# Patient Record
Sex: MALE | Race: WHITE | Employment: FULL TIME | URBAN - NONMETROPOLITAN AREA
[De-identification: names, ages, dates, MRNs, and addresses within clinical notes are randomized per-mention and may not be internally consistent; named-entity substitution may affect disease eponyms.]

---

## 2021-08-05 ENCOUNTER — HOSPITAL ENCOUNTER (OUTPATIENT)
Dept: ULTRASOUND IMAGING | Age: 41
Discharge: HOME | End: 2021-08-05
Payer: COMMERCIAL

## 2021-08-05 DIAGNOSIS — R19.05 PERIUMBILIC SWELLING, MASS OR LUMP: ICD-10-CM

## 2021-08-05 PROCEDURE — 76705 ECHO EXAM OF ABDOMEN: CPT

## 2025-01-11 ENCOUNTER — HOSPITAL ENCOUNTER (EMERGENCY)
Age: 45
Discharge: HOME | End: 2025-01-11
Attending: FAMILY MEDICINE
Payer: COMMERCIAL

## 2025-01-11 VITALS
HEIGHT: 72 IN | SYSTOLIC BLOOD PRESSURE: 117 MMHG | HEART RATE: 79 BPM | WEIGHT: 217 LBS | BODY MASS INDEX: 29.39 KG/M2 | RESPIRATION RATE: 16 BRPM | TEMPERATURE: 97 F | OXYGEN SATURATION: 97 % | DIASTOLIC BLOOD PRESSURE: 84 MMHG

## 2025-01-11 DIAGNOSIS — M54.31 SCIATICA OF RIGHT SIDE: Primary | ICD-10-CM

## 2025-01-11 PROCEDURE — 99284 EMERGENCY DEPT VISIT MOD MDM: CPT

## 2025-01-11 PROCEDURE — 6360000002 HC RX W HCPCS: Performed by: FAMILY MEDICINE

## 2025-01-11 PROCEDURE — 96372 THER/PROPH/DIAG INJ SC/IM: CPT

## 2025-01-11 RX ORDER — ORPHENADRINE CITRATE 30 MG/ML
60 INJECTION INTRAMUSCULAR; INTRAVENOUS ONCE
Status: COMPLETED | OUTPATIENT
Start: 2025-01-11 | End: 2025-01-11

## 2025-01-11 RX ORDER — CYCLOBENZAPRINE HCL 5 MG
7.5 TABLET ORAL DAILY
COMMUNITY

## 2025-01-11 RX ORDER — METHYLPREDNISOLONE 4 MG/1
4 TABLET ORAL SEE ADMIN INSTRUCTIONS
COMMUNITY

## 2025-01-11 RX ORDER — NAPROXEN 500 MG/1
500 TABLET ORAL 2 TIMES DAILY PRN
Qty: 20 TABLET | Refills: 0 | Status: SHIPPED | OUTPATIENT
Start: 2025-01-11 | End: 2025-01-21

## 2025-01-11 RX ADMIN — ORPHENADRINE CITRATE 60 MG: 60 INJECTION INTRAMUSCULAR; INTRAVENOUS at 10:33

## 2025-01-11 RX ADMIN — HYDROMORPHONE HYDROCHLORIDE 0.5 MG: 1 INJECTION, SOLUTION INTRAMUSCULAR; INTRAVENOUS; SUBCUTANEOUS at 10:33

## 2025-01-11 ASSESSMENT — ENCOUNTER SYMPTOMS
NAUSEA: 0
BACK PAIN: 1
ABDOMINAL PAIN: 0
VOMITING: 0
CONSTIPATION: 0

## 2025-01-11 ASSESSMENT — PAIN DESCRIPTION - PAIN TYPE: TYPE: ACUTE PAIN

## 2025-01-11 ASSESSMENT — PAIN DESCRIPTION - LOCATION: LOCATION: BACK

## 2025-01-11 ASSESSMENT — PAIN - FUNCTIONAL ASSESSMENT: PAIN_FUNCTIONAL_ASSESSMENT: 0-10

## 2025-01-11 ASSESSMENT — PAIN DESCRIPTION - DESCRIPTORS: DESCRIPTORS: ACHING

## 2025-01-11 NOTE — DISCHARGE INSTR - COC
Elimination:  Continence:   Bowel: {YES / NO:}  Bladder: {YES / NO:}  Urinary Catheter: {Urinary Catheter:997830188}   Colostomy/Ileostomy/Ileal Conduit: {YES / NO:}       Date of Last BM: ***  No intake or output data in the 24 hours ending 25 1114  No intake/output data recorded.    Safety Concerns:     { CECILIA Safety Concerns:517481375}    Impairments/Disabilities:      { CECILIA Impairments/Disabilities:501413451}    Nutrition Therapy:  Current Nutrition Therapy:   { CECILIA Diet List:374249244}    Routes of Feeding: {[facility] DME Other Feedings:712315956}  Liquids: {Slp liquid thickness:21836}  Daily Fluid Restriction: {[facility] DME Yes amt example:761647534}  Last Modified Barium Swallow with Video (Video Swallowing Test): {Done Not Done Date:698321847}    Treatments at the Time of Hospital Discharge:   Respiratory Treatments: ***  Oxygen Therapy:  {Therapy; copd oxygen:82070}  Ventilator:    { CC Vent List:256121415}    Rehab Therapies: {THERAPEUTIC INTERVENTION:9553560383}  Weight Bearing Status/Restrictions: {[facility] Weight Bearin}  Other Medical Equipment (for information only, NOT a DME order):  {EQUIPMENT:086561732}  Other Treatments: ***    Patient's personal belongings (please select all that are sent with patient):  {[facility] DME Belongings:054575078}    RN SIGNATURE:  {Esignature:289138368}    CASE MANAGEMENT/SOCIAL WORK SECTION    Inpatient Status Date: ***    Readmission Risk Assessment Score:  [facility] RISK OF UNPLANNED READMISSION 2.0             0 Total Score        Discharging to Facility/ Agency   Name:   Address:  Phone:  Fax:    Dialysis Facility (if applicable)   Name:  Address:  Dialysis Schedule:  Phone:  Fax:    / signature: {Esignature:830772233}    PHYSICIAN SECTION    Prognosis: {Prognosis:5511026221}    Condition at Discharge: { Patient Condition:615848543}    Rehab Potential (if transferring to Rehab): {Prognosis:0653094318}    Recommended Labs  or Other Treatments After Discharge: ***    Physician Certification: I certify the above information and transfer of Anthony WATKINS Joomayda  is necessary for the continuing treatment of the diagnosis listed and that he requires {Admit to Appropriate Level of Care:05802} for {GREATER/LESS:388435430} 30 days.     Update Admission H&P: {[facility] DME Changes in HandP:364055653}    PHYSICIAN SIGNATURE:  {Esignature:623300190}

## 2025-01-11 NOTE — DISCHARGE INSTRUCTIONS
May use cyclobenzaprine 15 mg twice daily. Stop steroid. Start naproxen 500 mg twice daily. Follow up with PCP in 2-3 days for re evaluation. Return to ED if worsening pain,inability to manage bowel movements or urine, or numbness in anal area.

## 2025-01-11 NOTE — ED PROVIDER NOTES
[facility]  eMERGENCY dEPARTMENT eNCOUnter          CHIEF COMPLAINT       Chief Complaint   Patient presents with    Back Pain     Mid low back pain radiating to right lower back. On prednisone dose pack with 2 days left.        Nurses Notes reviewed and I agree except as noted in the HPI.      HISTORY OF PRESENT ILLNESS    Anthony Laura is a 44 y.o. male who presents with mid right lower back pain with referred pain extending to right lateral thigh. No bowel or bladder changes. No anal numbness. No fever,chills. No urinary pain. No history of kidney stones. Has been using steroid and muscle relaxant with limited improvement since meeting with PCP last week. Denies any recent trauma. Notes history of bulging disks from MRI x 6 months ago.          REVIEW OF SYSTEMS     Review of Systems   Constitutional:  Positive for activity change. Negative for chills and fever.   Gastrointestinal:  Negative for abdominal pain, constipation, nausea and vomiting.   Genitourinary:  Negative for difficulty urinating, dysuria, flank pain, frequency, hematuria, scrotal swelling and testicular pain.   Musculoskeletal:  Positive for back pain.   Skin:  Negative for pallor and rash.   Neurological:         Referred pain to right lateral upper thigh area    All other systems reviewed and are negative.        PAST MEDICAL HISTORY    has no past medical history on file.    SURGICAL HISTORY      has no past surgical history on file.    CURRENT MEDICATIONS       Previous Medications    CYCLOBENZAPRINE (FLEXERIL) 5 MG TABLET    Take 1.5 tablets by mouth daily    METHYLPREDNISOLONE (MEDROL DOSEPACK) 4 MG TABLET    Take 1 tablet by mouth See Admin Instructions Take by mouth. Has 2 days worth left.       ALLERGIES     has No Known Allergies.    FAMILY HISTORY     has no family status information on file.    family history includes Heart Disease in his maternal grandfather and maternal grandmother.    SOCIAL HISTORY

## 2025-01-11 NOTE — ED NOTES
AVS rev'd with pt. And copy given with note for work. Pulse regular. Extremities warm. Respirations regular and quiet.  Mucous membranes pink & moist. Alert and oriented times 3.  No nausea or vomiting. Range of motion within patient's limits. Skin pink, warm and dry.  Calm and cooperative.

## 2025-01-11 NOTE — ED NOTES
Pt with mid low back pain which radiates to right lower back and partially down his leg. States he had an MRI about 6 mo ago of his back showing bulging discs of mid back. Pt states is hurts to sit, lye and stand.

## 2025-06-20 ENCOUNTER — APPOINTMENT (OUTPATIENT)
Dept: GENERAL RADIOLOGY | Age: 45
End: 2025-06-20
Payer: COMMERCIAL

## 2025-06-20 ENCOUNTER — HOSPITAL ENCOUNTER (EMERGENCY)
Age: 45
Discharge: HOME | End: 2025-06-20
Attending: FAMILY MEDICINE
Payer: COMMERCIAL

## 2025-06-20 VITALS
HEIGHT: 72 IN | DIASTOLIC BLOOD PRESSURE: 78 MMHG | RESPIRATION RATE: 19 BRPM | TEMPERATURE: 98.5 F | OXYGEN SATURATION: 95 % | HEART RATE: 84 BPM | WEIGHT: 203 LBS | BODY MASS INDEX: 27.5 KG/M2 | SYSTOLIC BLOOD PRESSURE: 103 MMHG

## 2025-06-20 DIAGNOSIS — R07.89 MUSCULAR CHEST PAIN: Primary | ICD-10-CM

## 2025-06-20 LAB
ALBUMIN SERPL BCP-MCNC: 4.2 GM/DL (ref 3.4–5)
ALP SERPL-CCNC: 68 U/L (ref 46–116)
ALT SERPL W P-5'-P-CCNC: 21 U/L (ref 14–63)
ANION GAP SERPL CALC-SCNC: 6 MEQ/L (ref 8–16)
AST SERPL W P-5'-P-CCNC: 15 U/L (ref 15–37)
BASOPHILS # BLD: 0.1 % (ref 0–3)
BASOPHILS ABSOLUTE: 0 THOU/MM3 (ref 0–0.1)
BILIRUB SERPL-MCNC: 0.8 MG/DL (ref 0.2–1)
BUN SERPL-MCNC: 10 MG/DL (ref 7–18)
CALCIUM SERPL-MCNC: 9.1 MG/DL (ref 8.5–10.1)
CHLORIDE SERPL-SCNC: 104 MEQ/L (ref 98–107)
CO2 SERPL-SCNC: 32 MEQ/L (ref 21–32)
CREAT SERPL-MCNC: 1.2 MG/DL (ref 0.6–1.3)
EKG ATRIAL RATE: 81 BPM
EKG P AXIS: 59 DEGREES
EKG P-R INTERVAL: 152 MS
EKG Q-T INTERVAL: 358 MS
EKG QRS DURATION: 92 MS
EKG QTC CALCULATION (BAZETT): 415 MS
EKG R AXIS: 52 DEGREES
EKG T AXIS: 61 DEGREES
EKG VENTRICULAR RATE: 81 BPM
EOSINOPHILS ABSOLUTE: 0.2 THOU/MM3 (ref 0–0.5)
EOSINOPHILS RELATIVE PERCENT: 2.3 % (ref 0–4)
GFR SERPL CREATININE-BSD FRML MDRD: 77 ML/MIN/1.73M2
GLUCOSE SERPL-MCNC: 118 MG/DL (ref 74–106)
HCT VFR BLD CALC: 45 % (ref 42–52)
HEMOGLOBIN: 14.9 GM/DL (ref 14–18)
IMMATURE GRANS (ABS): 0 THOU/MM3 (ref 0–0.07)
IMMATURE GRANULOCYTES %: 0 %
LYMPHOCYTES # BLD AUTO: 17.6 % (ref 15–47)
LYMPHOCYTES ABSOLUTE: 1.3 THOU/MM3 (ref 1–4.8)
MCH RBC QN AUTO: 30.4 PG (ref 26–32)
MCHC RBC AUTO-ENTMCNC: 33.1 GM/DL (ref 31–35)
MCV RBC AUTO: 91.8 FL (ref 80–94)
MONOCYTES: 0.7 THOU/MM3 (ref 0.3–1.3)
MONOCYTES: 10 % (ref 0–12)
NEUTROPHILS ABSOLUTE: 5.1 THOU/MM3 (ref 1.8–7.7)
PDW BLD-RTO: 12 % (ref 11.5–14.9)
PLATELET # BLD AUTO: 259 THOU/MM3 (ref 130–400)
PMV BLD AUTO: 10.1 FL (ref 9.4–12.4)
POTASSIUM SERPL-SCNC: 3.8 MEQ/L (ref 3.5–5.1)
PROT SERPL-MCNC: 7.3 GM/DL (ref 6.4–8.2)
RBC # BLD: 4.9 MILL/MM3 (ref 4.5–6.1)
SEG NEUTROPHILS: 69.9 % (ref 43–75)
SODIUM SERPL-SCNC: 142 MEQ/L (ref 136–145)
TROPONIN, HIGH SENSITIVITY: 7.2 PG/ML (ref 0–76.1)
WBC # BLD: 7.3 THOU/MM3 (ref 4.8–10.8)

## 2025-06-20 PROCEDURE — 93005 ELECTROCARDIOGRAM TRACING: CPT | Performed by: FAMILY MEDICINE

## 2025-06-20 PROCEDURE — 84484 ASSAY OF TROPONIN QUANT: CPT

## 2025-06-20 PROCEDURE — 71046 X-RAY EXAM CHEST 2 VIEWS: CPT

## 2025-06-20 PROCEDURE — 96374 THER/PROPH/DIAG INJ IV PUSH: CPT

## 2025-06-20 PROCEDURE — 93010 ELECTROCARDIOGRAM REPORT: CPT | Performed by: INTERNAL MEDICINE

## 2025-06-20 PROCEDURE — 6360000002 HC RX W HCPCS: Performed by: FAMILY MEDICINE

## 2025-06-20 PROCEDURE — 99285 EMERGENCY DEPT VISIT HI MDM: CPT

## 2025-06-20 PROCEDURE — 85025 COMPLETE CBC W/AUTO DIFF WBC: CPT

## 2025-06-20 PROCEDURE — 80053 COMPREHEN METABOLIC PANEL: CPT

## 2025-06-20 RX ORDER — KETOROLAC TROMETHAMINE 30 MG/ML
30 INJECTION, SOLUTION INTRAMUSCULAR; INTRAVENOUS ONCE
Status: COMPLETED | OUTPATIENT
Start: 2025-06-20 | End: 2025-06-20

## 2025-06-20 RX ORDER — ETODOLAC 400 MG/1
400 TABLET, EXTENDED RELEASE ORAL DAILY
Qty: 5 TABLET | Refills: 0 | Status: SHIPPED | OUTPATIENT
Start: 2025-06-20 | End: 2025-06-25

## 2025-06-20 RX ADMIN — KETOROLAC TROMETHAMINE 30 MG: 30 INJECTION, SOLUTION INTRAMUSCULAR at 00:42

## 2025-06-20 ASSESSMENT — PAIN DESCRIPTION - PAIN TYPE: TYPE: ACUTE PAIN

## 2025-06-20 ASSESSMENT — HEART SCORE: ECG: NORMAL

## 2025-06-20 ASSESSMENT — PAIN SCALES - GENERAL
PAINLEVEL_OUTOF10: 5
PAINLEVEL_OUTOF10: 5

## 2025-06-20 ASSESSMENT — PAIN - FUNCTIONAL ASSESSMENT
PAIN_FUNCTIONAL_ASSESSMENT: 0-10
PAIN_FUNCTIONAL_ASSESSMENT: 0-10

## 2025-06-20 ASSESSMENT — PAIN DESCRIPTION - LOCATION: LOCATION: CHEST

## 2025-06-20 ASSESSMENT — PAIN DESCRIPTION - DESCRIPTORS: DESCRIPTORS: SHARP

## 2025-06-20 NOTE — ED PROVIDER NOTES
[facility]  eMERGENCY dEPARTMENT eNCOUnter          CHIEF COMPLAINT       Chief Complaint   Patient presents with    Chest Pain       Nurses Notes reviewed and I agree except as noted in the HPI.    HISTORY OF PRESENT ILLNESS    Anthony Laura is a 44 y.o. male who presents to the Emergency Department for the evaluation of chest pain.  Patient reports that symptoms started yesterday morning.  He reports pain with deep breathing.  Denies any cough.  Denies any fevers.  Denies any history of diabetes hypertension dyslipidemia.  He said he occasionally vapes.  Denies any recent travel.  Denies any leg pain or swelling.  Denies any recent surgeries or hospitalizations.  He reports that he has been undergoing a lot of work stress and personal stress.      The HPI was provided by the patient.     REVIEW OF SYSTEMS       Eyes: no vision changes  Ears, Nose, Mouth, Throat: no hearing disturbance, no nasal swelling, no epistaxis, no difficulty swallowing  Cardiovascular: + chest pains  Respiratory: no SOB, no cough  Gastrointestinal: no abdominal pain, no nausea, no vomiting, no diarrhea  Genitourinary: no change in urinary frequency, no dysuria symptoms  Musculoskeletal: no joint pains, no muscle pains  Integumentary (skin and/or breast): no rashes, no swelling, no redness  Neurological: no weakness, no facial droop, no confusion  Psychiatric: no depression, no anxiety    MEDICAL HISTORY    has no past medical history on file.    SURGICAL HISTORY      has a past surgical history that includes hernia repair.    CURRENT MEDICATIONS       Previous Medications    No medications on file       ALLERGIES     has no known allergies.    FAMILY HISTORY     has no family status information on file.    family history includes Heart Disease in his maternal grandfather and maternal grandmother.    SOCIAL HISTORY      reports that he quit smoking about 13 years ago. His smoking use included cigarettes. He started

## 2025-06-20 NOTE — DISCHARGE INSTRUCTIONS
Anthony Laura,    It has been my absolute pleasure to serve you while in the emergency department at [facility] today.  Please do remember to take all medications as prescribed.  Please make sure you follow-up with your PCP within 7 days.  Please follow-up with any and all specialists as we discussed.  Please return to the ER in case of any worsening of symptoms.  I wish you a speedy recovery!    Sincerely,    Dr. Huber Ruiz MD.

## 2025-06-20 NOTE — ED NOTES
Pt stable and off to Radiology via ED cart with [facility] tech. Pt states no concerns and vitals stable.      [de-identified] : Patient is here for neck pain that began on 9/11/19 when he was playing football. He states that he was hit on hit right and left side and then hit the ground. Loss of consciousness is undetermined. He was experiencing N/T into his hands so he was transported via helicopter to [facility]. He was sent for an MRI which showed disc protrusion and small annular tear. He went to PT but states that he did not receive much help from him. He has some mild pain in his neck/thoracic back at this time. He has not experienced the N/T since the initial injury. \par \par The patient's past medical history, past surgical history, medications and allergies were reviewed by me today and documented accordingly. In addition, the patient's family and social history, which were noncontributory to this visit, were reviewed also. The patient has no family history of arthritis.

## 2025-06-20 NOTE — ED TRIAGE NOTES
Pt comes walking into ER room 1 with CHEST PAIN that has been ongoing all day. The pain is better when he stands up he says. 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